# Patient Record
Sex: MALE | URBAN - METROPOLITAN AREA
[De-identification: names, ages, dates, MRNs, and addresses within clinical notes are randomized per-mention and may not be internally consistent; named-entity substitution may affect disease eponyms.]

---

## 2018-04-17 ENCOUNTER — TELEPHONE (OUTPATIENT)
Dept: OTHER | Facility: CLINIC | Age: 43
End: 2018-04-17

## 2018-04-17 NOTE — TELEPHONE ENCOUNTER
4/17/2018    Call Regarding Onboarding [employer]    Attempt 1    Message on voicemail     Comments: NO DEPS        Outreach   AT

## 2018-05-08 NOTE — TELEPHONE ENCOUNTER
5/8/2018    Call Regarding Onboarding [employer] OTHER    Attempt 2    Message on voicemail     Comments: NO DEPS      Outreach   [id number]

## 2018-05-31 NOTE — TELEPHONE ENCOUNTER
5/31/2018    Call Regarding Onboarding [employer] Other     Attempt 3    Message on voicemail     Comments: 0 DEP      Outreach   CC